# Patient Record
Sex: FEMALE | Race: WHITE | NOT HISPANIC OR LATINO | URBAN - METROPOLITAN AREA
[De-identification: names, ages, dates, MRNs, and addresses within clinical notes are randomized per-mention and may not be internally consistent; named-entity substitution may affect disease eponyms.]

---

## 2017-07-24 ENCOUNTER — IMPORTED ENCOUNTER (OUTPATIENT)
Dept: URBAN - METROPOLITAN AREA CLINIC 50 | Facility: CLINIC | Age: 77
End: 2017-07-24

## 2017-07-28 ENCOUNTER — IMPORTED ENCOUNTER (OUTPATIENT)
Dept: URBAN - METROPOLITAN AREA CLINIC 50 | Facility: CLINIC | Age: 77
End: 2017-07-28

## 2018-01-26 ENCOUNTER — IMPORTED ENCOUNTER (OUTPATIENT)
Dept: URBAN - METROPOLITAN AREA CLINIC 50 | Facility: CLINIC | Age: 78
End: 2018-01-26

## 2018-02-22 ENCOUNTER — IMPORTED ENCOUNTER (OUTPATIENT)
Dept: URBAN - METROPOLITAN AREA CLINIC 50 | Facility: CLINIC | Age: 78
End: 2018-02-22

## 2018-03-02 ENCOUNTER — IMPORTED ENCOUNTER (OUTPATIENT)
Dept: URBAN - METROPOLITAN AREA CLINIC 50 | Facility: CLINIC | Age: 78
End: 2018-03-02

## 2018-03-07 ENCOUNTER — IMPORTED ENCOUNTER (OUTPATIENT)
Dept: URBAN - METROPOLITAN AREA CLINIC 50 | Facility: CLINIC | Age: 78
End: 2018-03-07

## 2018-03-07 NOTE — PATIENT DISCUSSION
"""[facility] +ORA/Femto/Arcs +TM. Continue post operative instructions and drops per schedule.  """

## 2018-03-12 ENCOUNTER — IMPORTED ENCOUNTER (OUTPATIENT)
Dept: URBAN - METROPOLITAN AREA CLINIC 50 | Facility: CLINIC | Age: 78
End: 2018-03-12

## 2018-03-20 ENCOUNTER — IMPORTED ENCOUNTER (OUTPATIENT)
Dept: URBAN - METROPOLITAN AREA CLINIC 50 | Facility: CLINIC | Age: 78
End: 2018-03-20

## 2018-03-28 ENCOUNTER — IMPORTED ENCOUNTER (OUTPATIENT)
Dept: URBAN - METROPOLITAN AREA CLINIC 50 | Facility: CLINIC | Age: 78
End: 2018-03-28

## 2018-03-28 NOTE — PATIENT DISCUSSION
"""S/P IOL OD: Tecnis  ZKB00 21.0 +Femto/Arcs +TM. Continue post operative instructions and drops per schedule.  """

## 2018-04-06 ENCOUNTER — IMPORTED ENCOUNTER (OUTPATIENT)
Dept: URBAN - METROPOLITAN AREA CLINIC 50 | Facility: CLINIC | Age: 78
End: 2018-04-06

## 2018-04-27 ENCOUNTER — IMPORTED ENCOUNTER (OUTPATIENT)
Dept: URBAN - METROPOLITAN AREA CLINIC 50 | Facility: CLINIC | Age: 78
End: 2018-04-27

## 2018-04-27 NOTE — PATIENT DISCUSSION
"""S/P IOL OU: OD: Tecnis MF ZKB00 21.0 Femto/Arcs +TM. OS: Tecnis MF ZKB00 21.0 (ORA) +ORA/Arcs +TM. MRx given today. "

## 2018-11-02 ENCOUNTER — IMPORTED ENCOUNTER (OUTPATIENT)
Dept: URBAN - METROPOLITAN AREA CLINIC 50 | Facility: CLINIC | Age: 78
End: 2018-11-02

## 2019-11-18 ENCOUNTER — IMPORTED ENCOUNTER (OUTPATIENT)
Dept: URBAN - METROPOLITAN AREA CLINIC 50 | Facility: CLINIC | Age: 79
End: 2019-11-18

## 2021-02-12 ENCOUNTER — IMPORTED ENCOUNTER (OUTPATIENT)
Dept: URBAN - METROPOLITAN AREA CLINIC 50 | Facility: CLINIC | Age: 81
End: 2021-02-12

## 2021-04-18 ASSESSMENT — VISUAL ACUITY
OS_OTHER: 20/400. 20/400.
OD_SC: 20/40
OS_SC: 20/30
OS_CC: J1+
OS_SC: 20/25-1
OD_PH: @ 16 IN
OD_OTHER: 20/50.
OD_SC: 20/40
OS_SC: 20/25
OS_SC: 20/25
OS_CC: J1+
OD_BAT: 20/60
OS_CC: J1+@ 16 IN
OD_OTHER: 20/50. 20/60.
OS_BAT: 20/400
OD_BAT: 20/50
OD_PH: 20/30
OD_SC: 20/25
OD_CC: J2@ 16 IN
OS_BAT: 20/400
OD_SC: 20/60-1
OS_PH: 20/40
OD_SC: 20/30
OS_BAT: 20/200
OD_BAT: 20/50
OD_SC: 20/30-1
OD_PH: @ 16 IN
OS_PH: @ 15 IN
OD_PH: 20/30
OS_SC: 20/40-2
OD_SC: 20/25
OS_OTHER: 20/40. 20/50.
OD_OTHER: 20/40. 20/60.
OS_SC: 20/25
OD_CC: J1+@ 16 IN
OS_CC: J1+
OD_OTHER: 20/50. 20/60.
OD_CC: J3@ 16 IN
OS_OTHER: 20/200.
OS_CC: J3@ 16 IN
OD_SC: 20/20
OD_OTHER: 20/60. 20/70.
OS_BAT: 20/50
OD_OTHER: 20/50. 20/60.
OD_PH: 20/25+
OS_OTHER: 20/40.
OD_SC: 20/30
OS_CC: J2@ 16 IN
OD_CC: J1+
OS_PH: @ 16 IN
OS_OTHER: 20/50. 20/70.
OS_SC: 20/25+1
OD_BAT: 20/60
OD_CC: J1+
OD_BAT: 20/50
OS_SC: 20/40-1
OD_OTHER: 20/60.
OS_PH: @ 16 IN
OD_BAT: 20/40
OS_SC: 20/50
OD_SC: 20/25
OS_CC: 20/80
OS_BAT: 20/40
OD_PH: @ 15 IN
OS_PH: @ 15 IN
OS_OTHER: 20/400.
OD_CC: J1+

## 2021-04-18 ASSESSMENT — TONOMETRY
OD_IOP_MMHG: 14
OS_IOP_MMHG: 14
OS_IOP_MMHG: 22
OD_IOP_MMHG: 17
OD_IOP_MMHG: 12
OD_IOP_MMHG: 22
OS_IOP_MMHG: 16
OS_IOP_MMHG: 12
OS_IOP_MMHG: 13
OS_IOP_MMHG: 12
OD_IOP_MMHG: 12
OD_IOP_MMHG: 12
OS_IOP_MMHG: 12
OS_IOP_MMHG: 14
OD_IOP_MMHG: 11
OS_IOP_MMHG: 12
OD_IOP_MMHG: 14
OD_IOP_MMHG: 13
OS_IOP_MMHG: 14
OD_IOP_MMHG: 13

## 2022-02-10 ENCOUNTER — PREPPED CHART (OUTPATIENT)
Dept: URBAN - METROPOLITAN AREA CLINIC 49 | Facility: CLINIC | Age: 82
End: 2022-02-10

## 2022-02-11 ENCOUNTER — COMPREHENSIVE EXAM (OUTPATIENT)
Dept: URBAN - METROPOLITAN AREA CLINIC 49 | Facility: CLINIC | Age: 82
End: 2022-02-11

## 2022-02-11 DIAGNOSIS — H35.372: ICD-10-CM

## 2022-02-11 DIAGNOSIS — H35.3131: ICD-10-CM

## 2022-02-11 DIAGNOSIS — H43.813: ICD-10-CM

## 2022-02-11 DIAGNOSIS — H26.491: ICD-10-CM

## 2022-02-11 PROCEDURE — 66821 AFTER CATARACT LASER SURGERY: CPT

## 2022-02-11 PROCEDURE — 92014 COMPRE OPH EXAM EST PT 1/>: CPT

## 2022-02-11 PROCEDURE — 92134 CPTRZ OPH DX IMG PST SGM RTA: CPT

## 2022-02-11 ASSESSMENT — VISUAL ACUITY
OS_GLARE: 20/50
OS_SC: 20/30-
OD_GLARE: <400
OU_SC: J3
OS_GLARE: 20/60
OD_SC: 20/50
OU_CC: J1+

## 2022-02-11 ASSESSMENT — TONOMETRY
OS_IOP_MMHG: 12
OD_IOP_MMHG: 13

## 2022-02-11 NOTE — PROCEDURE NOTE: CLINICAL
PROCEDURE NOTE: YAG Capsulotomy OD. Diagnosis: Posterior Capsular Opacification (PCO). Prep: Mydriacil 1% and Phenylephrine 2.5%. Prior to treatment, the risks/benefits/alternatives were discussed. The patient wished to proceed with procedure. Consent was signed. Proparacaine and brominidine were placed into the operative eye after the eye was dilated. Power = 3.6mJ. Number of pulses = 40. Patient tolerated procedure well and there were no complications. Post Laser instructions given. Montserrat Martinez

## 2022-02-11 NOTE — PATIENT DISCUSSION
PCO ([id number] [place] [id number]): Visually significant PCO present on exam today. Recommend YAG laser capsulotomy to improve vision and decrease glare symptoms. RBAs of procedure discussed. Patient agrees and wishes to proceed.

## 2022-04-21 ENCOUNTER — POST-OP (OUTPATIENT)
Dept: URBAN - METROPOLITAN AREA CLINIC 49 | Facility: CLINIC | Age: 82
End: 2022-04-21

## 2022-04-21 DIAGNOSIS — Z98.890: ICD-10-CM

## 2022-04-21 PROCEDURE — 92015 DETERMINE REFRACTIVE STATE: CPT

## 2022-04-21 ASSESSMENT — TONOMETRY
OS_IOP_MMHG: 12
OD_IOP_MMHG: 13

## 2022-04-21 ASSESSMENT — VISUAL ACUITY
OS_SC: 20/25
OD_SC: 20/20

## 2022-04-21 NOTE — PATIENT DISCUSSION
Follow dry ARMD without treatment. MVI/AREDS/Za. Patient to call if vision changes or distortion increases. Good diet/do not smoke.

## 2022-04-21 NOTE — PATIENT DISCUSSION
GLAUCOMA SUSPECT-C/D: The patient is a glaucoma suspect because of suspicious appearance of the optic disc(s). IOP today is 13/12. Will schedule HVF/RNFL at patients convenience will call patient with results.

## 2023-04-26 ENCOUNTER — COMPREHENSIVE EXAM (OUTPATIENT)
Dept: URBAN - METROPOLITAN AREA CLINIC 49 | Facility: CLINIC | Age: 83
End: 2023-04-26

## 2023-04-26 DIAGNOSIS — H40.013: ICD-10-CM

## 2023-04-26 DIAGNOSIS — H35.3132: ICD-10-CM

## 2023-04-26 DIAGNOSIS — H43.811: ICD-10-CM

## 2023-04-26 DIAGNOSIS — H35.372: ICD-10-CM

## 2023-04-26 PROCEDURE — 92014 COMPRE OPH EXAM EST PT 1/>: CPT

## 2023-04-26 PROCEDURE — 92134 CPTRZ OPH DX IMG PST SGM RTA: CPT

## 2023-04-26 PROCEDURE — 76514 ECHO EXAM OF EYE THICKNESS: CPT

## 2023-04-26 ASSESSMENT — VISUAL ACUITY
OD_SC: 20/25
OS_SC: 20/20-1
OU_CC: J1+ @ 15IN

## 2023-04-26 ASSESSMENT — PACHYMETRY
OS_CT_UM: 492
OD_CT_UM: 483

## 2023-04-26 ASSESSMENT — TONOMETRY
OS_IOP_MMHG: 13
OD_IOP_MMHG: 17
OS_IOP_MMHG: 17
OD_IOP_MMHG: 13

## 2024-01-04 ENCOUNTER — DIAGNOSTICS ONLY (OUTPATIENT)
Dept: URBAN - METROPOLITAN AREA CLINIC 49 | Facility: LOCATION | Age: 84
End: 2024-01-04

## 2024-01-04 DIAGNOSIS — H40.013: ICD-10-CM

## 2024-01-04 PROCEDURE — 92133 CPTRZD OPH DX IMG PST SGM ON: CPT

## 2024-01-04 PROCEDURE — 92083 EXTENDED VISUAL FIELD XM: CPT
